# Patient Record
Sex: MALE | Race: WHITE | NOT HISPANIC OR LATINO | Employment: OTHER | URBAN - METROPOLITAN AREA
[De-identification: names, ages, dates, MRNs, and addresses within clinical notes are randomized per-mention and may not be internally consistent; named-entity substitution may affect disease eponyms.]

---

## 2018-08-21 NOTE — PATIENT DISCUSSION
"""S/P IOL OS: Tecnis ZCB00 +20.0 (Target: [place]) +Femto/Arcs +TM. Continue post operative instructions and drops per schedule.  """

## 2018-08-30 NOTE — PATIENT DISCUSSION
"""S/P IOL OS: Tecnis ZCB00 +20.0 (Target: [place]) +Femto/Arcs +TM. Continue post operative instructions and drops per schedule.  """

## 2018-09-28 NOTE — PATIENT DISCUSSION
"""S/P IOL OS: Tecnis ZCB00 +20.0 (Target: [place]) +Femto/Arcs +TM. Continue post operative instructions and drops per schedule.  """

## 2023-04-11 ENCOUNTER — FOLLOW UP (OUTPATIENT)
Dept: URBAN - METROPOLITAN AREA CLINIC 39 | Facility: CLINIC | Age: 63
End: 2023-04-11

## 2023-04-11 DIAGNOSIS — H35.352: ICD-10-CM

## 2023-04-11 DIAGNOSIS — H40.1123: ICD-10-CM

## 2023-04-11 DIAGNOSIS — H20.00: ICD-10-CM

## 2023-04-11 DIAGNOSIS — H25.811: ICD-10-CM

## 2023-04-11 DIAGNOSIS — H40.1114: ICD-10-CM

## 2023-04-11 PROCEDURE — 92083 EXTENDED VISUAL FIELD XM: CPT

## 2023-04-11 PROCEDURE — 92012 INTRM OPH EXAM EST PATIENT: CPT

## 2023-04-11 PROCEDURE — 92134 CPTRZ OPH DX IMG PST SGM RTA: CPT

## 2023-04-11 RX ORDER — AMOXICILLIN 500 MG/1
1 CAPSULE ORAL
Start: 2023-04-11

## 2023-04-11 ASSESSMENT — VISUAL ACUITY
OD_CC: 20/20
OS_CC: 20/40-2

## 2023-04-11 ASSESSMENT — TONOMETRY
OD_IOP_MMHG: 19
OS_IOP_MMHG: 20

## 2023-04-19 ENCOUNTER — FOLLOW UP (OUTPATIENT)
Dept: URBAN - METROPOLITAN AREA CLINIC 39 | Facility: CLINIC | Age: 63
End: 2023-04-19

## 2023-04-19 DIAGNOSIS — H40.1123: ICD-10-CM

## 2023-04-19 DIAGNOSIS — H40.1114: ICD-10-CM

## 2023-04-19 DIAGNOSIS — H35.352: ICD-10-CM

## 2023-04-19 DIAGNOSIS — H20.00: ICD-10-CM

## 2023-04-19 PROCEDURE — 92012 INTRM OPH EXAM EST PATIENT: CPT

## 2023-04-19 ASSESSMENT — VISUAL ACUITY
OS_CC: 20/50-2
OD_CC: 20/20-1

## 2023-04-19 ASSESSMENT — TONOMETRY
OD_IOP_MMHG: 12
OS_IOP_MMHG: 20

## 2023-04-24 ENCOUNTER — CONSULTATION/EVALUATION (OUTPATIENT)
Dept: URBAN - METROPOLITAN AREA CLINIC 43 | Facility: CLINIC | Age: 63
End: 2023-04-24

## 2023-04-24 DIAGNOSIS — H20.00: ICD-10-CM

## 2023-04-24 DIAGNOSIS — H35.352: ICD-10-CM

## 2023-04-24 DIAGNOSIS — H40.1114: ICD-10-CM

## 2023-04-24 DIAGNOSIS — H40.1123: ICD-10-CM

## 2023-04-24 DIAGNOSIS — H35.363: ICD-10-CM

## 2023-04-24 DIAGNOSIS — H43.813: ICD-10-CM

## 2023-04-24 DIAGNOSIS — H35.30: ICD-10-CM

## 2023-04-24 DIAGNOSIS — H35.09: ICD-10-CM

## 2023-04-24 PROCEDURE — 92235 FLUORESCEIN ANGRPH MLTIFRAME: CPT

## 2023-04-24 PROCEDURE — 92287 ANT SGM IMG IR FLRSCN ANGRPH: CPT

## 2023-04-24 PROCEDURE — 92250 FUNDUS PHOTOGRAPHY W/I&R: CPT

## 2023-04-24 PROCEDURE — 99214 OFFICE O/P EST MOD 30 MIN: CPT

## 2023-04-24 ASSESSMENT — VISUAL ACUITY
OD_CC: 20/20
OS_CC: 20/60

## 2023-04-24 ASSESSMENT — TONOMETRY
OD_IOP_MMHG: 14
OS_IOP_MMHG: 15

## 2023-04-26 ENCOUNTER — CONSULTATION/EVALUATION (OUTPATIENT)
Dept: URBAN - METROPOLITAN AREA CLINIC 39 | Facility: CLINIC | Age: 63
End: 2023-04-26

## 2023-04-26 DIAGNOSIS — H40.1123: ICD-10-CM

## 2023-04-26 DIAGNOSIS — S05.12XD: ICD-10-CM

## 2023-04-26 DIAGNOSIS — Z98.890: ICD-10-CM

## 2023-04-26 DIAGNOSIS — H35.352: ICD-10-CM

## 2023-04-26 DIAGNOSIS — H20.00: ICD-10-CM

## 2023-04-26 PROCEDURE — 76514 ECHO EXAM OF EYE THICKNESS: CPT

## 2023-04-26 PROCEDURE — 92002 INTRM OPH EXAM NEW PATIENT: CPT

## 2023-04-26 ASSESSMENT — PACHYMETRY
OS_CT_UM: 596
OD_CT_UM: 566

## 2023-04-26 ASSESSMENT — TONOMETRY
OD_IOP_MMHG: 19
OS_IOP_MMHG: 17

## 2023-04-26 ASSESSMENT — VISUAL ACUITY
OS_CC: 20/60+1
OD_CC: 20/20